# Patient Record
Sex: MALE | Race: BLACK OR AFRICAN AMERICAN | NOT HISPANIC OR LATINO | Employment: STUDENT | URBAN - METROPOLITAN AREA
[De-identification: names, ages, dates, MRNs, and addresses within clinical notes are randomized per-mention and may not be internally consistent; named-entity substitution may affect disease eponyms.]

---

## 2017-05-04 ENCOUNTER — HOSPITAL ENCOUNTER (EMERGENCY)
Facility: HOSPITAL | Age: 27
Discharge: HOME | End: 2017-05-04
Attending: EMERGENCY MEDICINE

## 2017-05-04 VITALS
SYSTOLIC BLOOD PRESSURE: 144 MMHG | TEMPERATURE: 99 F | WEIGHT: 170 LBS | RESPIRATION RATE: 14 BRPM | BODY MASS INDEX: 25.76 KG/M2 | HEART RATE: 81 BPM | HEIGHT: 68 IN | OXYGEN SATURATION: 96 % | DIASTOLIC BLOOD PRESSURE: 87 MMHG

## 2017-05-04 DIAGNOSIS — L08.9 FINGER INFECTION: Primary | ICD-10-CM

## 2017-05-04 PROCEDURE — 99283 EMERGENCY DEPT VISIT LOW MDM: CPT

## 2017-05-04 PROCEDURE — 99283 EMERGENCY DEPT VISIT LOW MDM: CPT | Mod: ,,, | Performed by: PHYSICIAN ASSISTANT

## 2017-05-04 PROCEDURE — 25000003 PHARM REV CODE 250: Performed by: EMERGENCY MEDICINE

## 2017-05-04 RX ORDER — NAPROXEN 500 MG/1
500 TABLET ORAL
Status: COMPLETED | OUTPATIENT
Start: 2017-05-04 | End: 2017-05-04

## 2017-05-04 RX ORDER — NAPROXEN 500 MG/1
500 TABLET ORAL 2 TIMES DAILY WITH MEALS
Qty: 30 TABLET | Refills: 0 | Status: SHIPPED | OUTPATIENT
Start: 2017-05-04

## 2017-05-04 RX ORDER — CLINDAMYCIN HYDROCHLORIDE 150 MG/1
300 CAPSULE ORAL EVERY 6 HOURS
Status: DISCONTINUED | OUTPATIENT
Start: 2017-05-04 | End: 2017-05-04

## 2017-05-04 RX ORDER — NAPROXEN 500 MG/1
500 TABLET ORAL 2 TIMES DAILY WITH MEALS
Status: DISCONTINUED | OUTPATIENT
Start: 2017-05-04 | End: 2017-05-04

## 2017-05-04 RX ORDER — CLINDAMYCIN HYDROCHLORIDE 300 MG/1
300 CAPSULE ORAL EVERY 8 HOURS
Qty: 21 CAPSULE | Refills: 0 | Status: SHIPPED | OUTPATIENT
Start: 2017-05-04

## 2017-05-04 RX ORDER — CLINDAMYCIN HYDROCHLORIDE 150 MG/1
300 CAPSULE ORAL
Status: COMPLETED | OUTPATIENT
Start: 2017-05-04 | End: 2017-05-04

## 2017-05-04 RX ADMIN — CLINDAMYCIN HYDROCHLORIDE 300 MG: 150 CAPSULE ORAL at 04:05

## 2017-05-04 RX ADMIN — NAPROXEN 500 MG: 500 TABLET ORAL at 04:05

## 2017-05-04 NOTE — ED AVS SNAPSHOT
[facility]  [address]  [facility] [address]               Luis Miguel Girod   2017  3:24 AM   ED    Description:  Male : 1990   Department:  [facility]           Your Care was Coordinated By:     Provider Role From To    Winston Villegas MD Attending Provider 17 [phone] --    Ho Kessler PA-C Physician Assistant 17 [phone] --      Reason for Visit     Hand Pain           Diagnoses this Visit        Comments    Finger infection    -  Primary       ED Disposition     ED Disposition Condition Comment    Discharge             To Do List            These Medications        Disp Refills Start End    naproxen (NAPROSYN) 500 MG tablet 30 tablet 0 2017     Take 1 tablet (500 mg total) by mouth 2 (two) times daily with meals. - Oral    clindamycin (CLEOCIN) 300 MG capsule 21 capsule 0 2017     Take 1 capsule (300 mg total) by mouth every 8 (eight) hours. - Oral      [facility]     [facility] -  Assistance  Unless otherwise directed by your provider, please contact [facility], our nurse care line that is available for  assistance.     Registered nurses in the [facility] provide: appointment scheduling, clinical advisement, health education, and other advisory services.  Call: [phone] (toll free)               Medications           Message regarding Medications     Verify the changes and/or additions to your medication regime listed below are the same as discussed with your clinician today.  If any of these changes or additions are incorrect, please notify your healthcare provider.        START taking these NEW medications        Refills    naproxen (NAPROSYN) 500 MG tablet 0    Sig: Take 1 tablet (500 mg total) by mouth 2 (two) times daily with meals.    Class: Print    Route: Oral    clindamycin (CLEOCIN) 300 MG capsule 0    Sig: Take 1 capsule (300 mg total) by mouth every  "8 (eight) hours.    Class: Print    Route: Oral      These medications were administered today        Dose Freq    clindamycin capsule 300 mg 300 mg Every 6 hours    Sig: Take 2 capsules (300 mg total) by mouth every 6 (six) hours.    Class: Normal    Route: Oral    naproxen tablet 500 mg 500 mg 2 times daily with meals    Sig: Take 1 tablet (500 mg total) by mouth 2 (two) times daily with meals.    Class: Normal    Route: Oral           Verify that the below list of medications is an accurate representation of the medications you are currently taking.  If none reported, the list may be blank. If incorrect, please contact your healthcare provider. Carry this list with you in case of emergency.           Current Medications     clindamycin (CLEOCIN) 300 MG capsule Take 1 capsule (300 mg total) by mouth every 8 (eight) hours.    clindamycin capsule 300 mg Take 2 capsules (300 mg total) by mouth every 6 (six) hours.    naproxen (NAPROSYN) 500 MG tablet Take 1 tablet (500 mg total) by mouth 2 (two) times daily with meals.    naproxen tablet 500 mg Take 1 tablet (500 mg total) by mouth 2 (two) times daily with meals.           Clinical Reference Information           Your Vitals Were     BP Pulse Temp Resp Height Weight    144/87 81 98.7 °F (37.1 °C) (Oral) 14 5' 8" (1.727 m) 77.1 kg (170 lb)    SpO2 BMI             96% 25.85 kg/m2         Allergies as of 5/4/2017     No Known Allergies      Immunizations Administered on Date of Encounter - 5/4/2017     None      ED Micro, Lab, POCT     None      ED Imaging Orders     None       [facility] complies with applicable Federal civil rights laws and does not discriminate on the basis of race, color, national origin, age, disability, or sex.        Language Assistance Services     ATTENTION: Language assistance services are available, free of charge. Please call [phone].      ATENCIÓN: Si habla español, tiene a thapa disposición servicios gratuitos de " asistencia lingüística. [facility] [phone].     OSEI Ý: N?u b?n nói Ti?ng Vi?t, có các d?ch v? h? tr? ngôn ng? mi?n phí dignah cho b?n. G?i s? [phone].

## 2017-05-04 NOTE — ED PROVIDER NOTES
Encounter Date: 5/4/2017       History     Chief Complaint   Patient presents with    Hand Pain     Pt reports left thumb pain that began Sunday. Pt reports draining pus out of thumb yesterday. Believes it may be a spider bite.     Review of patient's allergies indicates:  No Known Allergies  HPI Comments: 26-year-old male presents to the ER with chief complaint of pain and swelling to the pad of his left thumb.  Patient reports being bitten by something a few days ago.  There was some swelling and he tried to stick a needle into it.  He has no other symptoms.  He denies any fever or chills.    Past Medical History:   Diagnosis Date    Blunt trauma of left eye 05/07/13    HIT IN OS WHILE PLAYING BASEBALL     No past surgical history on file.  Family History   Problem Relation Age of Onset    Hypertension Maternal Grandmother      Social History   Substance Use Topics    Smoking status: Never Smoker    Smokeless tobacco: Not on file    Alcohol use No     Review of Systems   Constitutional: Negative for fever.   HENT: Negative for sore throat.    Respiratory: Negative for shortness of breath.    Cardiovascular: Negative for chest pain.   Gastrointestinal: Negative for nausea.   Genitourinary: Negative for dysuria.   Musculoskeletal: Negative for back pain.   Skin: Positive for color change and wound. Negative for rash.   Neurological: Negative for dizziness and weakness.   Hematological: Does not bruise/bleed easily.       Physical Exam   Initial Vitals   BP Pulse Resp Temp SpO2   05/04/17 0312 05/04/17 0312 05/04/17 0312 05/04/17 0312 05/04/17 0312   144/87 81 14 98.7 °F (37.1 °C) 96 %     Physical Exam    Constitutional: Vital signs are normal. He appears well-developed and well-nourished.   HENT:   Head: Normocephalic and atraumatic.   Eyes: Conjunctivae are normal.   Cardiovascular: Normal rate and regular rhythm. Exam reveals no gallop and no friction rub.    No murmur heard.  Pulmonary/Chest: No  respiratory distress. He has no wheezes. He has no rhonchi. He has no rales. He exhibits no tenderness.   Abdominal: Soft. Normal appearance, normal aorta and bowel sounds are normal.   Musculoskeletal: Normal range of motion.   Range of motion of the left thumb is normal.   Neurological: He is alert and oriented to person, place, and time.   Skin: Skin is warm and intact.   There is mild swelling to the pad of the left thumb.  There is no paronychia.  There is no identifiable abscess.  There appears to be a very small pus pocket approximately no larger than a few millimeters.  There is cellulitis from the IP joint distally.   Psychiatric: He has a normal mood and affect. His speech is normal and behavior is normal. Cognition and memory are normal.         ED Course   Procedures  Labs Reviewed - No data to display          Medical Decision Making:   ED Management:  Patient with soft tissue infection to the pad of the left thumb.  Will treat with antibiotics and anti-inflammatory medications.  There is no need for incision and drainage recommend follow-up with orthopedic hand surgery if there is no improvement within 48 hours.                   ED Course     Clinical Impression:   The encounter diagnosis was Finger infection.    Disposition:   Disposition: Discharged  Condition: Stable       Ho Kessler PA-C  05/04/17 0456